# Patient Record
Sex: FEMALE | Race: WHITE
[De-identification: names, ages, dates, MRNs, and addresses within clinical notes are randomized per-mention and may not be internally consistent; named-entity substitution may affect disease eponyms.]

---

## 2019-04-09 ENCOUNTER — HOSPITAL ENCOUNTER (OUTPATIENT)
Dept: HOSPITAL 10 - GIL | Age: 64
Discharge: HOME | End: 2019-04-09
Attending: INTERNAL MEDICINE
Payer: MEDICARE

## 2019-04-09 ENCOUNTER — HOSPITAL ENCOUNTER (OUTPATIENT)
Dept: HOSPITAL 91 - GIL | Age: 64
Discharge: HOME | End: 2019-04-09
Payer: MEDICARE

## 2019-04-09 VITALS
WEIGHT: 129.19 LBS | WEIGHT: 129.19 LBS | HEIGHT: 60 IN | HEIGHT: 60 IN | BODY MASS INDEX: 25.36 KG/M2 | BODY MASS INDEX: 25.36 KG/M2

## 2019-04-09 VITALS — RESPIRATION RATE: 12 BRPM | DIASTOLIC BLOOD PRESSURE: 73 MMHG | SYSTOLIC BLOOD PRESSURE: 173 MMHG | HEART RATE: 58 BPM

## 2019-04-09 DIAGNOSIS — I10: ICD-10-CM

## 2019-04-09 DIAGNOSIS — R19.4: Primary | ICD-10-CM

## 2019-04-09 DIAGNOSIS — E11.9: ICD-10-CM

## 2019-04-09 PROCEDURE — 45378 DIAGNOSTIC COLONOSCOPY: CPT

## 2019-04-09 PROCEDURE — 82962 GLUCOSE BLOOD TEST: CPT

## 2019-04-09 NOTE — PREAC
Date/Time of Note


Date/Time of Note


DATE: 4/9/19 


TIME: 09:26





Anesthesia Eval and Record


Evaluation


Time Pre-Procedure Interview


DATE: 4/9/19 


TIME: 09:26


Age


63


Sex


female


NPO:  8 hrs


Preoperative diagnosis


Change in bowel habit


Planned procedure


Colonoscopy





Past Medical History


Past Medical History:  Includes


Cardio:  HTN


Endo:  Diabetes





Surgery & Anesthesia Issues


No known issue





Meds


Anticoagulation:  No


Beta Blocker within 24 hr:  No


Reason Beta Blocker not given:  Pt. not on B-Blocker


Meds reviewed:  Yes





Allergies


Allergies Reviewed:  Yes





Labs/Studies


Labs Reviewed:  Reviewed by anesthesiologist


Pregnancy test:  N/A





Pre-procedure Exam


Airway:  Adequate mouth opening


Mallampati:  Mallampati I


Teeth:  Abnormal (Full denture)


Lung:  Normal


Heart:  Normal





ASA Physical Status


ASA physical status:  2


Emergency:  None





Planned Anesthetic


General/MAC:  MAC





Planned Pain Management


Parenteral pain med





Pre-operative Attestations


Prior to commencing anesthesia and surgery, the patient was re-evaluated, there 


was verification of:


*The patient's identity


*The results of appropriate recent lab work and preoperative vital signs


*The above evaluation not changing prior to induction


*Anesthetic plan, risk benefits, alternative and complications discussed with 


patient/family; questions answered; patient/family understands, accepts and 


wishes to proceed.











ROSALIA CARDOSO MD                Apr 9, 2019 09:28

## 2019-04-09 NOTE — PAC
Date/Time of Note


Date/Time of Note


DATE: 4/9/19 


TIME: 11:01





Post-Anesthesia Notes


Post-Anesthesia Note


Last documented vital signs





Vital Signs


  Date      Temp  Pulse  Resp  B/P (MAP)   Pulse Ox  O2          O2 Flow    FiO2


Time                                                 Delivery    Rate


    4/9/19  97.4     58    12      173/73        98  Room Air


     09:52                          (106)





Activity:  WNL


Respiratory function:  WNL


Cardiovascular function:  WNL


Mental status:  Baseline


Pain reasonably controlled:  Yes


Hydration appropriate:  Yes


Nausea/Vomiting absent:  Yes


Comments


BT: 98.3, BP: 137/64, HR: 61, RR: 24, Pulse Ox: 99











ROSALIA CARDOSO MD                Apr 9, 2019 11:03

## 2019-04-10 NOTE — CONS
DATE OF ADMISSION: 04/09/2019

DATE OF CONSULTATION:  

 

 

 

PATIENT NAME: JAMES BERMAN

 

TYPE OF CONSULTATION:  Preoperative gastroenterology.

 

Dear Dr. Pride:

 

I thank you very much for this kind referral.

 

HISTORY OF PRESENT ILLNESS:  The patient is a 63-year-old female patient who has been referred to me 
for further evaluation of left lower quadrant abdominal pain and change in the bowel habit.  She had 
abdominal CT scan done and it was normal.  No past history of colon neoplasm.  The patient needs scre
ening colonoscopy.  Her appetite has been good, and she is not losing any weight.  No upper abdominal
 pain.  Not on nonsteroidal anti-inflammatory agents.  Status post cholecystectomy.  No liver disease
.  Not a hypertensive, has diabetes.  No heart disease, lung problem or kidney disease.  Status post 
hysterectomy.

 

SOCIAL HISTORY:  Nonsmoker.  No alcohol abuse.

 

FAMILY HISTORY:  No family history of gastrointestinal tract neoplasm.  She is 5 feet tall and weighs
 133 pounds.  BMI 26, blood pressure 148/82.

HEART:  Normal heart sounds.

LUNGS:  Clear.

ABDOMEN:  Soft, no masses.  Normal bowel sounds.

NEUROLOGIC:  Normal neurological exam.

 

IMPRESSION:

1.  Left lower quadrant abdominal pain.

2.  Change in the bowel habit.

3.  The patient had abdominal CT scan and it was normal.

4.  The patient needs screening colonoscopy.

5.  Diabetes mellitus.

6.  Status post cholecystectomy and hysterectomy. 

7.  Slightly elevated BMI and high blood pressure.

 

PLAN:

1.  Follow up with the primary MD for the management of slightly elevated BMI and elevated blood pres
sure.

2.  Colonoscopy for further evaluation.

 

The procedure and possible complications are well explained to the patient.  She understands and cons
ents to the procedure.

 

I thank you once again.

 

With warmest personal regards,

 

 

Dictated By: MADDIE JACOBS/BEKA

DD:    04/01/2019 14:37:35

DT:    04/01/2019 15:42:12

Conf#: [id number]

DID#:  [id number]